# Patient Record
Sex: FEMALE | Race: WHITE | Employment: UNEMPLOYED | URBAN - METROPOLITAN AREA
[De-identification: names, ages, dates, MRNs, and addresses within clinical notes are randomized per-mention and may not be internally consistent; named-entity substitution may affect disease eponyms.]

---

## 2017-04-24 ENCOUNTER — TELEPHONE (OUTPATIENT)
Dept: FAMILY MEDICINE CLINIC | Facility: CLINIC | Age: 62
End: 2017-04-24

## 2017-04-24 RX ORDER — SCOLOPAMINE TRANSDERMAL SYSTEM 1 MG/1
1 PATCH, EXTENDED RELEASE TRANSDERMAL
Qty: 6 PATCH | Refills: 0 | Status: SHIPPED | OUTPATIENT
Start: 2017-04-24

## 2017-04-24 NOTE — TELEPHONE ENCOUNTER
Pt is going on a trip and is asking if she can have a RX for the \"sea sickness patch\". She has been prescribed this once before. Please. advise. Thank you.

## 2017-04-24 NOTE — TELEPHONE ENCOUNTER
Please notify patient her prescription for Scoplamine patch was sent to the pharmacy. Dr. Huff Mail would like her scheduled to be seen as she has not been seen since 2015.

## 2017-04-24 NOTE — TELEPHONE ENCOUNTER
Call to pt-sts she is leaving 5/27/17 and returning 6/12/17 (gone for 16 days)   Record shows pt previous;y had transdermal scopolamine ordered in 2015. See initial call/med request and advise-thanks!

## 2017-05-12 ENCOUNTER — TELEPHONE (OUTPATIENT)
Dept: FAMILY MEDICINE CLINIC | Facility: CLINIC | Age: 62
End: 2017-05-12

## 2017-05-12 ENCOUNTER — LAB ENCOUNTER (OUTPATIENT)
Dept: LAB | Age: 62
End: 2017-05-12
Attending: FAMILY MEDICINE
Payer: COMMERCIAL

## 2017-05-12 ENCOUNTER — OFFICE VISIT (OUTPATIENT)
Dept: FAMILY MEDICINE CLINIC | Facility: CLINIC | Age: 62
End: 2017-05-12

## 2017-05-12 VITALS
SYSTOLIC BLOOD PRESSURE: 120 MMHG | DIASTOLIC BLOOD PRESSURE: 70 MMHG | HEIGHT: 66.5 IN | BODY MASS INDEX: 23.5 KG/M2 | HEART RATE: 72 BPM | RESPIRATION RATE: 14 BRPM | WEIGHT: 148 LBS

## 2017-05-12 DIAGNOSIS — Z12.4 SCREENING FOR CERVICAL CANCER: ICD-10-CM

## 2017-05-12 DIAGNOSIS — E78.00 PURE HYPERCHOLESTEROLEMIA: Primary | ICD-10-CM

## 2017-05-12 DIAGNOSIS — Z00.00 ANNUAL PHYSICAL EXAM: ICD-10-CM

## 2017-05-12 DIAGNOSIS — Z13.89 SCREENING FOR GENITOURINARY CONDITION: ICD-10-CM

## 2017-05-12 DIAGNOSIS — Z00.00 ROUTINE GENERAL MEDICAL EXAMINATION AT A HEALTH CARE FACILITY: Primary | ICD-10-CM

## 2017-05-12 DIAGNOSIS — Z80.3 FAMILY HISTORY OF BREAST CANCER IN FIRST DEGREE RELATIVE: ICD-10-CM

## 2017-05-12 DIAGNOSIS — Z90.710 H/O TOTAL HYSTERECTOMY: ICD-10-CM

## 2017-05-12 DIAGNOSIS — Z00.00 LABORATORY EXAMINATION ORDERED AS PART OF A ROUTINE GENERAL MEDICAL EXAMINATION: ICD-10-CM

## 2017-05-12 DIAGNOSIS — E01.0 THYROMEGALY: ICD-10-CM

## 2017-05-12 DIAGNOSIS — Z12.31 ENCOUNTER FOR SCREENING MAMMOGRAM FOR BREAST CANCER: ICD-10-CM

## 2017-05-12 DIAGNOSIS — M85.80 OSTEOPENIA, UNSPECIFIED LOCATION: ICD-10-CM

## 2017-05-12 DIAGNOSIS — E55.9 VITAMIN D DEFICIENCY: ICD-10-CM

## 2017-05-12 PROCEDURE — 87624 HPV HI-RISK TYP POOLED RSLT: CPT | Performed by: FAMILY MEDICINE

## 2017-05-12 PROCEDURE — 88175 CYTOPATH C/V AUTO FLUID REDO: CPT | Performed by: FAMILY MEDICINE

## 2017-05-12 PROCEDURE — 99396 PREV VISIT EST AGE 40-64: CPT | Performed by: FAMILY MEDICINE

## 2017-05-12 PROCEDURE — 81003 URINALYSIS AUTO W/O SCOPE: CPT | Performed by: FAMILY MEDICINE

## 2017-05-12 NOTE — H&P
CC: Annual Physical Exam    HPI:   Aparna Calderon is a 59year old female who presents for a complete physical exam. Symptoms: is menopausal. Patient complains of nothing.      Wt Readings from Last 6 Encounters:  06/06/16 : 158 lb  08/10/15 : 149 lb  06 Result Value Ref Range   WBC 5.5 4.0-13.0 x10(3) uL   RBC 4.96 3.80-5.10 x10(6)uL   HGB 15.0 12.0-16.0 g/dL   HCT 45.0 37.0-54.0 %   .0 150.0-450.0 10(3)uL   MCV 90.7 81.0-100.0 fL   MCH 30.2 27.0-33.2 pg   MCHC 33.3 31.0-37.0 g/dL   RDW 13.7 11. 5 • Other[other] [OTHER] Sister      osteoporosis   • Cancer Maternal Aunt      breast cancer   • Breast Cancer Maternal Aunt 61      Social History:     Smoking Status: Never Smoker                      Smokeless Status: Never Used a 59year old female who presents for a complete physical exam.   Pap and pelvic done. Order put in for mammogram and dexascan. Self breast exam explained. Health maintenance, will check: No orders of the defined types were placed in this encounter.

## 2017-05-12 NOTE — TELEPHONE ENCOUNTER
Pt is curretnly at [address] and she wants all of her labs to go there she is fasting and waiting.

## 2017-05-12 NOTE — TELEPHONE ENCOUNTER
Pt called catherine/PSR stating she is at [facility] now and fasting-being told there are no lab orders for her. sts just saw dr Pia Jacobsen this morning. nAdrea Bai advised pt would let nurse know. Review of record shows labs were placed today for edward.    Lab orders red

## 2017-06-26 ENCOUNTER — HOSPITAL ENCOUNTER (OUTPATIENT)
Dept: BONE DENSITY | Age: 62
Discharge: HOME | End: 2017-06-26
Attending: FAMILY MEDICINE
Payer: COMMERCIAL

## 2017-06-26 ENCOUNTER — HOSPITAL ENCOUNTER (OUTPATIENT)
Dept: MAMMOGRAPHY | Age: 62
Discharge: HOME | End: 2017-06-26
Attending: FAMILY MEDICINE
Payer: COMMERCIAL

## 2017-06-26 DIAGNOSIS — Z80.3 FAMILY HISTORY OF BREAST CANCER IN FIRST DEGREE RELATIVE: ICD-10-CM

## 2017-06-26 DIAGNOSIS — Z12.31 ENCOUNTER FOR SCREENING MAMMOGRAM FOR BREAST CANCER: ICD-10-CM

## 2017-06-26 DIAGNOSIS — M85.80 OSTEOPENIA, UNSPECIFIED LOCATION: ICD-10-CM

## 2017-06-26 DIAGNOSIS — Z90.710 H/O TOTAL HYSTERECTOMY: ICD-10-CM

## 2017-06-26 PROCEDURE — 77067 SCR MAMMO BI INCL CAD: CPT | Performed by: FAMILY MEDICINE

## 2017-06-26 PROCEDURE — 77080 DXA BONE DENSITY AXIAL: CPT | Performed by: FAMILY MEDICINE

## 2017-06-26 PROCEDURE — 77063 BREAST TOMOSYNTHESIS BI: CPT | Performed by: FAMILY MEDICINE

## 2017-06-30 ENCOUNTER — HOSPITAL ENCOUNTER (OUTPATIENT)
Dept: ULTRASOUND IMAGING | Age: 62
Discharge: HOME | End: 2017-06-30
Attending: FAMILY MEDICINE
Payer: COMMERCIAL

## 2017-06-30 DIAGNOSIS — E01.0 THYROMEGALY: ICD-10-CM

## 2017-06-30 PROCEDURE — 76536 US EXAM OF HEAD AND NECK: CPT | Performed by: FAMILY MEDICINE

## 2017-07-12 ENCOUNTER — OFFICE VISIT (OUTPATIENT)
Dept: FAMILY MEDICINE CLINIC | Facility: CLINIC | Age: 62
End: 2017-07-12

## 2017-07-12 VITALS
BODY MASS INDEX: 23.03 KG/M2 | HEIGHT: 66.5 IN | HEART RATE: 72 BPM | DIASTOLIC BLOOD PRESSURE: 70 MMHG | SYSTOLIC BLOOD PRESSURE: 120 MMHG | WEIGHT: 145 LBS | RESPIRATION RATE: 14 BRPM

## 2017-07-12 DIAGNOSIS — M81.6 LOCALIZED OSTEOPOROSIS WITHOUT CURRENT PATHOLOGICAL FRACTURE: Primary | ICD-10-CM

## 2017-07-12 PROCEDURE — 99214 OFFICE O/P EST MOD 30 MIN: CPT | Performed by: FAMILY MEDICINE

## 2017-07-12 RX ORDER — ALENDRONATE SODIUM 70 MG/1
70 TABLET ORAL WEEKLY
Qty: 4 TABLET | Refills: 2 | Status: SHIPPED | OUTPATIENT
Start: 2017-07-12 | End: 2017-11-02

## 2017-11-03 RX ORDER — ALENDRONATE SODIUM 70 MG/1
TABLET ORAL
Qty: 4 TABLET | Refills: 2 | Status: SHIPPED | OUTPATIENT
Start: 2017-11-03 | End: 2018-01-27

## 2018-01-29 RX ORDER — ALENDRONATE SODIUM 70 MG/1
TABLET ORAL
Qty: 4 TABLET | Refills: 0 | Status: SHIPPED | OUTPATIENT
Start: 2018-01-29 | End: 2018-02-23

## 2018-01-29 NOTE — TELEPHONE ENCOUNTER
PLs call to tell the pt that this is the last refill since it has been more than 6 months and she needs to find a pcp.

## 2018-02-23 RX ORDER — ALENDRONATE SODIUM 70 MG/1
TABLET ORAL
Qty: 4 TABLET | Refills: 2 | Status: SHIPPED | OUTPATIENT
Start: 2018-02-23

## 2018-05-30 RX ORDER — ALENDRONATE SODIUM 70 MG/1
TABLET ORAL
Qty: 12 TABLET | Refills: 0 | OUTPATIENT
Start: 2018-05-30

## 2018-05-30 NOTE — TELEPHONE ENCOUNTER
I spoke with patient, has moved out of town, requests 90 day supply until she Establishes with new PCP in [place], last office visit, 7/12/2017, last Dexa Scan 6/26/2017. Please approve if appropriate, thank you.

## 2018-05-30 NOTE — TELEPHONE ENCOUNTER
Pt has moved away, she no longer lives in town and pt was transferred back to a Triage Nurse because she needs a refill on her medication.   Pt will establish care where she resides now!!

## 2019-01-18 ENCOUNTER — PATIENT OUTREACH (OUTPATIENT)
Dept: FAMILY MEDICINE CLINIC | Facility: CLINIC | Age: 64
End: 2019-01-18

## 2019-01-19 NOTE — PROGRESS NOTES
Please call pt to inquire if pt has had a colonoscopy in the last 10 years and if so who performed it (name and phone #). Please let Nohemy Fernandez know so I can obtain the report.     If pt did not have a colonoscopy please advise them we will leave the FIT stoo

## 2019-02-06 NOTE — PROGRESS NOTES
Attempted to contact pt again. Phone continually rang. Sent unable to reach letter via [address]

## 2020-03-03 ENCOUNTER — PATIENT OUTREACH (OUTPATIENT)
Dept: FAMILY MEDICINE CLINIC | Facility: CLINIC | Age: 65
End: 2020-03-03

## 2020-03-10 NOTE — PROGRESS NOTES
Attempted to contact pt. Unable to leave a voicemail-mailbox was full. Sent unable to reach letter via [employer].

## (undated) NOTE — Clinical Note
4/26/2017    Tanner Salas  [address] [facility] [address]    Dear Ms. Gould,     Our office has been trying to contact you to discuss your recent test results or you are due for an appointment with your provider.   It is important that we reach

## (undated) NOTE — MR AVS SNAPSHOT
[facility], [address] [address] [phone]               Thank you for choosing us for your health care visit with Steve Cohn DO.   We are glad to serve you and happy to provide you with this summary XR DEXA BONE DENSITOMETRY (CPT=77080)    Complete by: May 12, 2017 (Approximate)    Assoc Dx:  Osteopenia, unspecified location [M85.80], H/O total hysterectomy [Z90.710]           [facility] ERIC 2D+3D SCREENING BILAT (CPT=77067/22431)    Complete by:   May 12, Current Medications          This list is accurate as of: 5/12/17  9:32 AM.  Always use your most recent med list.                ibuprofen 200 MG Tabs   Take 200 mg by mouth every 6 (six) hours as needed for Pain.  Take 3 pills as needed   Commonly known a